# Patient Record
Sex: MALE | Race: WHITE
[De-identification: names, ages, dates, MRNs, and addresses within clinical notes are randomized per-mention and may not be internally consistent; named-entity substitution may affect disease eponyms.]

---

## 2018-09-04 ENCOUNTER — HOSPITAL ENCOUNTER (OUTPATIENT)
Dept: HOSPITAL 47 - RADXRMAIN | Age: 68
Discharge: HOME | End: 2018-09-04
Attending: NURSE PRACTITIONER
Payer: MEDICARE

## 2018-09-04 DIAGNOSIS — R05: Primary | ICD-10-CM

## 2018-09-04 PROCEDURE — 71046 X-RAY EXAM CHEST 2 VIEWS: CPT

## 2018-09-04 NOTE — XR
EXAMINATION TYPE: XR chest 2V

 

DATE OF EXAM: 9/4/2018

 

COMPARISON: NONE

 

HISTORY: Cough

 

TECHNIQUE:  Frontal and lateral views of the chest are obtained.

 

FINDINGS:  There is no focal air space opacity, pleural effusion, or pneumothorax seen.  The cardiac 
silhouette size is within normal limits.  Thoracic spondylosis is suspected. The osseous structures a
re intact. There is bronchial wall thickening.

 

IMPRESSION:  Correlate for bronchitis, reactive airways disease, follow-up as indicated.

## 2018-10-15 ENCOUNTER — HOSPITAL ENCOUNTER (OUTPATIENT)
Dept: HOSPITAL 47 - RADUSWWP | Age: 68
Discharge: HOME | End: 2018-10-15
Attending: FAMILY MEDICINE
Payer: MEDICARE

## 2018-10-15 DIAGNOSIS — Z13.9: Primary | ICD-10-CM

## 2018-10-15 PROCEDURE — 93979 VASCULAR STUDY: CPT

## 2018-10-15 NOTE — US
EXAMINATION TYPE: US duplex aorta

 

DATE OF EXAM: 10/15/2018

 

COMPARISON: NONE

 

CLINICAL HISTORY: 68-year-old male Z13.9 Encounter For Screening.  No family history or HTN per patie
nt.

 

TECHNIQUE: Multiple sonographic images of the abdominal aorta are obtained.

 

FINDINGS:

 

EXAM MEASUREMENTS:

 

Abdominal Aorta:

** Proximal:  2.2cm A/P

** Mid:  2.2cm Transverse

** Distal:  2.0cm A/P

** Bifurcation:  1.4cm Right common iliac artery Transverse; 1.5cm Trans Left common iliac artery

 

 

 

 

 

IMPRESSION: 

No sonographic evidence for abdominal aortic ectasia or AAA.